# Patient Record
(demographics unavailable — no encounter records)

---

## 2025-05-22 NOTE — HISTORY OF PRESENT ILLNESS
[de-identified] : Patient with worsening sneezing - rhinorrhea - coughing - itchy - red and swollen eyes - she has been taking prn Benadryl - Zyrtec with no change in her symptoms.    Her symptoms are worse during the spring months but perennial.   She goes to school in [place] and [place] wor[place].      No history of asthma as child.   No food allergies.

## 2025-05-22 NOTE — IMPRESSION
[_____] : grasses ([unfilled]) [Allergy Testing Cockroach] : cockroach [Allergy Testing Dog] : dog [Allergy Testing Cat] : cat [] : molds [Allergy Testing Weeds] : weeds

## 2025-05-22 NOTE — SOCIAL HISTORY
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

## 2025-05-22 NOTE — PHYSICAL EXAM
[Alert] : alert [Well Nourished] : well nourished [Healthy Appearance] : healthy appearance [No Acute Distress] : no acute distress [Well Developed] : well developed [Normal Voice/Communication] : normal voice communication [Suborbital Bogginess] : suborbital bogginess (allergic shiners) [Normal Nasal Mucosa] : the nasal mucosa was normal [No Neck Mass] : no neck mass was observed [No LAD] : no lymphadenopathy [Normal Rate and Effort] : normal respiratory rhythm and effort [No Crackles] : no crackles [No Retractions] : no retractions [Normal Rate] : heart rate was normal  [Normal S1, S2] : normal S1 and S2 [Regular Rhythm] : with a regular rhythm [Normal Cervical Lymph Nodes] : cervical [Skin Intact] : skin intact  [No Rash] : no rash [Normal Mood] : mood was normal [Judgment and Insight Age Appropriate] : judgement and insight is age appropriate [Wheezing] : no wheezing was heard

## 2025-05-22 NOTE — ASSESSMENT
[FreeTextEntry1] : Seasonal allergic rhinoconjunctivitis:  Azelastine 2 puffs each nostril BID  Azelastine 2 drops each eye BID prn  Allegra 180 mg QD Avoidance measures reviewed with patient  RV prn

## 2025-07-15 NOTE — DISCUSSION/SUMMARY
[de-identified] :  The patients condition is acute.  Confounding medical conditions/concerns:  Tests/Studies Independently Interpreted Today   X-Ray Unilateral hip 2 views (AP & lateral) were obtained and independently reviewed on 07/07/2025 reveals evidence of shallow hip joint consistent with dysplasia   ----------------------------------------------------------------------------------------------------------  Due to the pain in the R hip with mechanical symptoms we prescribed an MRI of the R hip to r/o labral tearing vs hip dysplasia.  Discussed risks of potential surgery. However, due to the risks of the surgery, we will try NSAIDs and therapy. Discussed management of medication.  Prescribed patient Meloxicam 15mg daily for two weeks. It will then be used PRN for pain, inflammation and discomfort. Discussed risks of side effects, as well as timing/management of medication. Side effects can include but are not limited to gastrointestinal ulcers and irritation, kidney failure, and bleeding issues. Use as directed and take with food to manage pain, inflammation, and discomfort.  Advised the pt to avoid placing unnecessary strain on the R hip and to avoid standing or walking for long periods of time to reduce pain.   Follow up after the MRI.   REBECCA

## 2025-07-15 NOTE — REASON FOR VISIT
Bed: [place] 01  Expected date:   Expected time:   Means of arrival:   Comments:  ems   [FreeTextEntry2] : Right hip pain

## 2025-07-15 NOTE — PHYSICAL EXAM
[Right] : right hip [] : non-antalgic [FreeTextEntry9] : + Pita sign [TWNoteComboBox7] : flexion 110 degrees

## 2025-07-15 NOTE — HISTORY OF PRESENT ILLNESS
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 had sharp pain when standing or walking for long periods of time. no specific injury/trauma. no prior hx with the hip. pain is 8/10. [FreeTextEntry1] : Right hip

## 2025-07-15 NOTE — DISCUSSION/SUMMARY
[de-identified] :  The patients condition is acute.  Confounding medical conditions/concerns:  Tests/Studies Independently Interpreted Today   X-Ray Unilateral hip 2 views (AP & lateral) were obtained and independently reviewed on 07/07/2025 reveals evidence of shallow hip joint consistent with dysplasia   ----------------------------------------------------------------------------------------------------------  Due to the pain in the R hip with mechanical symptoms we prescribed an MRI of the R hip to r/o labral tearing vs hip dysplasia.  Discussed risks of potential surgery. However, due to the risks of the surgery, we will try NSAIDs and therapy. Discussed management of medication.  Prescribed patient Meloxicam 15mg daily for two weeks. It will then be used PRN for pain, inflammation and discomfort. Discussed risks of side effects, as well as timing/management of medication. Side effects can include but are not limited to gastrointestinal ulcers and irritation, kidney failure, and bleeding issues. Use as directed and take with food to manage pain, inflammation, and discomfort.  Advised the pt to avoid placing unnecessary strain on the R hip and to avoid standing or walking for long periods of time to reduce pain.   Follow up after the MRI.   REBECCA

## 2025-07-15 NOTE — HISTORY OF PRESENT ILLNESS
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 had sharp pain when standing or walking for long periods of time. no specific injury/trauma. no prior hx with the hip. pain is 8/10. [FreeTextEntry1] : Right hip

## 2025-07-21 NOTE — DISCUSSION/SUMMARY
[de-identified] :  The patients condition is acute.  Confounding medical conditions/concerns:     X-Ray Unilateral hip 2 views (AP & lateral) were obtained and independently reviewed on 07/07/2025 reveals evidence of shallow hip joint consistent with dysplasia  MRI Right hip 7/8/25 [facility], report and images  independently reviewed 7/14/25 showing b/l hip dysplasia and b/l bursitis L>R, no labral tear  ----------------------------------------------------------------------------------------------------------  Advised patient to f/up with OB-GYN re: ovarian cysts seen on MRI   Prescribed patient Meloxicam 15mg daily for two weeks. It will then be used PRN for pain, inflammation and discomfort. Discussed risks of side effects, as well as timing/management of medication. Side effects can include but are not limited to gastrointestinal ulcers and irritation, kidney failure, and bleeding issues. Use as directed and take with food to manage pain, inflammation, and discomfort.  Advised the pt to avoid placing unnecessary strain on the R hip and to avoid standing or walking for long periods of time to reduce pain.   Prescribed physical therapy today, the patient will attend 2-3x/week for approximately 4-6 weeks, recommended working on strengthening  Follow up is as needed  Jena PICKETT, attest that this documentation has been prepared under the direction and in the presence of Provider Dr. Juliocesar Grove.

## 2025-07-21 NOTE — HISTORY OF PRESENT ILLNESS
[de-identified] :  Jul 14, 2025: Patient is here for FU on RT hip pain, still having some pain. Patient c/o increased pain in LT hip now. No new symptoms. Pain level for RT hip: 6. Patient is here today to review MRI results for RT hip.

## 2025-07-21 NOTE — DISCUSSION/SUMMARY
[de-identified] :  The patients condition is acute.  Confounding medical conditions/concerns:     X-Ray Unilateral hip 2 views (AP & lateral) were obtained and independently reviewed on 07/07/2025 reveals evidence of shallow hip joint consistent with dysplasia  MRI Right hip 7/8/25 [facility], report and images  independently reviewed 7/14/25 showing b/l hip dysplasia and b/l bursitis L>R, no labral tear  ----------------------------------------------------------------------------------------------------------  Advised patient to f/up with OB-GYN re: ovarian cysts seen on MRI   Prescribed patient Meloxicam 15mg daily for two weeks. It will then be used PRN for pain, inflammation and discomfort. Discussed risks of side effects, as well as timing/management of medication. Side effects can include but are not limited to gastrointestinal ulcers and irritation, kidney failure, and bleeding issues. Use as directed and take with food to manage pain, inflammation, and discomfort.  Advised the pt to avoid placing unnecessary strain on the R hip and to avoid standing or walking for long periods of time to reduce pain.   Prescribed physical therapy today, the patient will attend 2-3x/week for approximately 4-6 weeks, recommended working on strengthening  Follow up is as needed  Jena PICKETT, attest that this documentation has been prepared under the direction and in the presence of Provider Dr. Juliocesar Grove.

## 2025-07-21 NOTE — HISTORY OF PRESENT ILLNESS
[de-identified] :  Jul 14, 2025: Patient is here for FU on RT hip pain, still having some pain. Patient c/o increased pain in LT hip now. No new symptoms. Pain level for RT hip: 6. Patient is here today to review MRI results for RT hip.